# Patient Record
Sex: MALE | URBAN - METROPOLITAN AREA
[De-identification: names, ages, dates, MRNs, and addresses within clinical notes are randomized per-mention and may not be internally consistent; named-entity substitution may affect disease eponyms.]

---

## 2022-05-25 ENCOUNTER — OFFICE VISIT (OUTPATIENT)
Dept: URBAN - METROPOLITAN AREA CLINIC 28 | Facility: CLINIC | Age: 27
End: 2022-05-25
Payer: COMMERCIAL

## 2022-05-25 DIAGNOSIS — H16.143 BILATERAL PUNCTATE KERATITIS: Primary | ICD-10-CM

## 2022-05-25 DIAGNOSIS — H40.013 OPEN ANGLE WITH BORDERLINE FINDINGS, LOW RISK, BILATERAL: ICD-10-CM

## 2022-05-25 PROCEDURE — 99203 OFFICE O/P NEW LOW 30 MIN: CPT | Performed by: OPTOMETRIST

## 2022-05-25 RX ORDER — LOTEPREDNOL ETABONATE 2.5 MG/ML
0.25 % SUSPENSION/ DROPS OPHTHALMIC
Qty: 9.3 | Refills: 0 | Status: ACTIVE
Start: 2022-05-25

## 2022-05-25 ASSESSMENT — INTRAOCULAR PRESSURE
OD: 18
OS: 18

## 2022-05-25 ASSESSMENT — KERATOMETRY
OS: 41.63
OD: 41.63

## 2022-05-25 NOTE — IMPRESSION/PLAN
Impression: Open angle with borderline findings, low risk, bilateral: H40.013. Plan: Educated on exam findings and condition. Discussed findings of today's exam with patient, including diagnosis, in detail. Would like patient to return for testing to rule out glaucoma. Recommend glaucoma evaluation with baseline RNFL OCT and pachymetry next available. Patient aware this appointment will take longer than the average appointment. Monitor. Pt has had testing in the past with WNL results.

## 2022-05-25 NOTE — IMPRESSION/PLAN
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 Plan: Educated on exam findings. Educated on Thygeson's keratitis findings. Start Eysuvis QID OU. D/c CL wear at this time, will likely be able to resume once decreased on Eysuvis to BID. Monitor in 1.5 weeks for dye eye f/u.

## 2022-06-03 ENCOUNTER — OFFICE VISIT (OUTPATIENT)
Dept: URBAN - METROPOLITAN AREA CLINIC 28 | Facility: CLINIC | Age: 27
End: 2022-06-03
Payer: COMMERCIAL

## 2022-06-03 DIAGNOSIS — H40.013 OPEN ANGLE WITH BORDERLINE FINDINGS, LOW RISK, BILATERAL: Primary | ICD-10-CM

## 2022-06-03 DIAGNOSIS — H16.143 BILATERAL PUNCTATE KERATITIS: ICD-10-CM

## 2022-06-03 DIAGNOSIS — D31.31 BENIGN NEOPLASM OF RIGHT CHOROID: ICD-10-CM

## 2022-06-03 PROCEDURE — 92014 COMPRE OPH EXAM EST PT 1/>: CPT | Performed by: OPTOMETRIST

## 2022-06-03 PROCEDURE — 76514 ECHO EXAM OF EYE THICKNESS: CPT | Performed by: OPTOMETRIST

## 2022-06-03 PROCEDURE — 92133 CPTRZD OPH DX IMG PST SGM ON: CPT | Performed by: OPTOMETRIST

## 2022-06-03 RX ORDER — LOTEPREDNOL ETABONATE 2.5 MG/ML
0.25 % SUSPENSION/ DROPS OPHTHALMIC
Qty: 9.3 | Refills: 1 | Status: ACTIVE
Start: 2022-06-03

## 2022-06-03 ASSESSMENT — INTRAOCULAR PRESSURE
OS: 16
OD: 19
OS: 20
OD: 16

## 2022-06-03 NOTE — IMPRESSION/PLAN
Impression: Open angle with borderline findings, low risk, bilateral: H40.013. Plan: Pt has had testing in the past with WNL results. Educated on exam findings and condition. Based on exam findings and testing, no signs of glaucomatous damage. Careful observation was discussed and is strongly recommended to prevent possible future vision loss. Will continue to monitor intraocular pressure and testing in clinic at regular intervals. Pt has thinning on RNFL OCT, but states he has had testing in the past with WNL results. Will monitor with repeat RNFL OCT in 6 months and initiate treatment with any changes.

## 2022-06-03 NOTE — IMPRESSION/PLAN
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 Plan: Educated on exam findings. Educated on Thygeson's keratitis findings. Decrease Eysuvis TID OU and hopefully reduce to BID OU after f/u in 1 month. Educated on side effects of steroids especially considering glaucoma suspect status. . D/c CL wear at this time, will likely be able to resume once decreased on Eysuvis to BID. Monitor in 1 month for thygeson keratitis f/u.

## 2022-06-03 NOTE — IMPRESSION/PLAN
Impression: Benign neoplasm of right choroid: D31.31. Plan: Educated on exam findings. Recommend continued annual monitoring.

## 2022-07-08 ENCOUNTER — OFFICE VISIT (OUTPATIENT)
Dept: URBAN - METROPOLITAN AREA CLINIC 28 | Facility: CLINIC | Age: 27
End: 2022-07-08
Payer: COMMERCIAL

## 2022-07-08 DIAGNOSIS — H16.143 BILATERAL PUNCTATE KERATITIS: Primary | ICD-10-CM

## 2022-07-08 PROCEDURE — 99213 OFFICE O/P EST LOW 20 MIN: CPT | Performed by: OPTOMETRIST

## 2022-07-08 RX ORDER — LOTEPREDNOL ETABONATE 5 MG/ML
0.5 % SUSPENSION/ DROPS OPHTHALMIC
Qty: 5 | Refills: 1 | Status: ACTIVE
Start: 2022-07-08

## 2022-07-08 ASSESSMENT — INTRAOCULAR PRESSURE
OS: 20
OD: 18

## 2022-07-08 NOTE — IMPRESSION/PLAN
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 Plan: Educated on exam findings. Educated on Thygeson's keratitis findings. Switch to lotemax TID OU and start Refresh PM QHS OU. Educated on side effects of steroids especially considering glaucoma suspect status. D/c CL wear at this time. Goal is to swtich back to Eysuvis TID/BID OU after resolution of keratitis findings. Monitor in 3 weeks for thygeson keratitis f/u.

## 2022-07-29 ENCOUNTER — OFFICE VISIT (OUTPATIENT)
Dept: URBAN - METROPOLITAN AREA CLINIC 28 | Facility: CLINIC | Age: 27
End: 2022-07-29
Payer: COMMERCIAL

## 2022-07-29 DIAGNOSIS — H40.053 OCULAR HYPERTENSION, BILATERAL: ICD-10-CM

## 2022-07-29 DIAGNOSIS — H16.143 BILATERAL PUNCTATE KERATITIS: Primary | ICD-10-CM

## 2022-07-29 PROCEDURE — 99213 OFFICE O/P EST LOW 20 MIN: CPT | Performed by: OPTOMETRIST

## 2022-07-29 RX ORDER — BRIMONIDINE TARTRATE, TIMOLOL MALEATE 2; 5 MG/ML; MG/ML
SOLUTION/ DROPS OPHTHALMIC
Qty: 0 | Refills: 0 | Status: ACTIVE
Start: 2022-07-29

## 2022-07-29 ASSESSMENT — INTRAOCULAR PRESSURE
OS: 27
OD: 27

## 2022-07-29 NOTE — IMPRESSION/PLAN
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 Plan: Educated on exam findings. Educated on Thygeson's keratitis findings. IOP spike, so d/c all steroids. Sample of combigan given to be used BID OU. Continue Systane ATs 4-5 times per day and Refresh PM QHS OU. Educated on side effects of steroids especially considering glaucoma suspect status. D/c CL wear at this time.  F/u with corneal specialist.

## 2022-08-11 ENCOUNTER — OFFICE VISIT (OUTPATIENT)
Dept: URBAN - METROPOLITAN AREA CLINIC 28 | Facility: CLINIC | Age: 27
End: 2022-08-11
Payer: COMMERCIAL

## 2022-08-11 DIAGNOSIS — H40.013 OPEN ANGLE WITH BORDERLINE FINDINGS, LOW RISK, BILATERAL: ICD-10-CM

## 2022-08-11 DIAGNOSIS — H16.143 BILATERAL PUNCTATE KERATITIS: Primary | ICD-10-CM

## 2022-08-11 PROCEDURE — 99213 OFFICE O/P EST LOW 20 MIN: CPT | Performed by: OPTOMETRIST

## 2022-08-11 RX ORDER — LOTEPREDNOL ETABONATE 2.5 MG/ML
0.25 % SUSPENSION/ DROPS OPHTHALMIC
Qty: 9.3 | Refills: 1 | Status: ACTIVE
Start: 2022-08-11

## 2022-08-11 RX ORDER — BRIMONIDINE TARTRATE, TIMOLOL MALEATE 2; 5 MG/ML; MG/ML
SOLUTION/ DROPS OPHTHALMIC
Qty: 5 | Refills: 5 | Status: ACTIVE
Start: 2022-08-11

## 2022-08-11 ASSESSMENT — INTRAOCULAR PRESSURE
OD: 12
OS: 12

## 2022-08-11 NOTE — IMPRESSION/PLAN
Impression: Open angle with borderline findings, low risk, bilateral: H40.013. Plan: Pt has had testing in the past with WNL results. Educated on exam findings and condition. Based on exam findings and testing, no signs of glaucomatous damage. Careful observation was discussed and is strongly recommended to prevent possible future vision loss. Will continue to monitor intraocular pressure and testing in clinic at regular intervals. Pt has thinning on RNFL OCT, but states he has had testing in the past with WNL results. Will monitor with repeat RNFL OCT in 6 months and continue combigan QAM OU (see above).

## 2022-08-11 NOTE — IMPRESSION/PLAN
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 Plan: Educated on exam findings. Educated on Thygeson's keratitis findings. H/o steroid IOP spike. Pt had good IOP response to [place] [facility] [place] and pt is glaucoma suspect with h/o IOP spike, so continue combigan QAM OU. Educated on managing symptoms of thygesons as conservatively as possible: continue Systane ATs 4-5 times per day and Refresh PM QHS OU. May need to use Eysuvis prn for flare ups. D/c CL wear at this time. Monitor as needed/if needed with Dr. Sona Ghotra.